# Patient Record
Sex: FEMALE | Race: BLACK OR AFRICAN AMERICAN | NOT HISPANIC OR LATINO | Employment: FULL TIME | URBAN - METROPOLITAN AREA
[De-identification: names, ages, dates, MRNs, and addresses within clinical notes are randomized per-mention and may not be internally consistent; named-entity substitution may affect disease eponyms.]

---

## 2019-08-06 ENCOUNTER — TELEPHONE (OUTPATIENT)
Dept: RHEUMATOLOGY | Facility: CLINIC | Age: 29
End: 2019-08-06

## 2019-08-06 NOTE — TELEPHONE ENCOUNTER
----- Message from Kateryna Villalta sent at 8/6/2019 10:36 AM CDT -----  Contact: Gwendolyn- from Dr. Sammy Gu office  Gwendolyn states she has faxed over 3 referrals to Dr. Hendrix's office trying to get the patient scheduled for an appointment and haven't heard back from the office. The patient is willing to see anyone in Rheumatology. Please call back at Ph [phone].(Gwendolyn)

## 2024-04-22 ENCOUNTER — OFFICE VISIT (OUTPATIENT)
Dept: URGENT CARE | Facility: CLINIC | Age: 34
End: 2024-04-22
Payer: COMMERCIAL

## 2024-04-22 VITALS
RESPIRATION RATE: 85 BRPM | HEART RATE: 68 BPM | SYSTOLIC BLOOD PRESSURE: 120 MMHG | TEMPERATURE: 99 F | OXYGEN SATURATION: 100 % | DIASTOLIC BLOOD PRESSURE: 68 MMHG

## 2024-04-22 DIAGNOSIS — J01.00 ACUTE NON-RECURRENT MAXILLARY SINUSITIS: Primary | ICD-10-CM

## 2024-04-22 LAB
CTP QC/QA: YES
SARS-COV-2 AG RESP QL IA.RAPID: NEGATIVE

## 2024-04-22 PROCEDURE — 87811 SARS-COV-2 COVID19 W/OPTIC: CPT | Mod: QW,S$GLB,, | Performed by: NURSE PRACTITIONER

## 2024-04-22 PROCEDURE — 99213 OFFICE O/P EST LOW 20 MIN: CPT | Mod: S$GLB,,, | Performed by: NURSE PRACTITIONER

## 2024-04-22 RX ORDER — DEXAMETHASONE 4 MG/1
4 TABLET ORAL DAILY
Qty: 5 TABLET | Refills: 0 | Status: SHIPPED | OUTPATIENT
Start: 2024-04-22 | End: 2024-04-27

## 2024-04-22 RX ORDER — PROMETHAZINE HYDROCHLORIDE AND DEXTROMETHORPHAN HYDROBROMIDE 6.25; 15 MG/5ML; MG/5ML
5 SYRUP ORAL EVERY 6 HOURS PRN
Qty: 180 ML | Refills: 0 | Status: SHIPPED | OUTPATIENT
Start: 2024-04-22

## 2024-04-22 RX ORDER — IPRATROPIUM BROMIDE 42 UG/1
2 SPRAY, METERED NASAL 4 TIMES DAILY PRN
Qty: 15 ML | Refills: 1 | Status: SHIPPED | OUTPATIENT
Start: 2024-04-22

## 2024-04-22 RX ORDER — CEFDINIR 300 MG/1
300 CAPSULE ORAL 2 TIMES DAILY
Qty: 14 CAPSULE | Refills: 0 | Status: SHIPPED | OUTPATIENT
Start: 2024-04-22 | End: 2024-04-29

## 2024-04-22 NOTE — PROGRESS NOTES
Subjective:      Patient ID: Sylvain Palacios is a 33 y.o. female.    Vitals:  oral temperature is 99.2 °F (37.3 °C). Her blood pressure is 120/68 and her pulse is 68. Her respiration is 85 (abnormal) and oxygen saturation is 100%.     Chief Complaint: Sinus Problem    Patient is presenting pain on the left side of her face with slight swelling , sinus issues, coughing green mucus, and headaches. Taking tylenol, alca celter plus. Patient has been sick for over a week.    Facial Pain  This is a new problem. The current episode started in the past 7 days. The problem occurs constantly. The problem has been gradually worsening. Associated symptoms include chest pain, congestion, coughing, fatigue, headaches, a sore throat and swollen glands. Pertinent negatives include no abdominal pain, anorexia, arthralgias, change in bowel habit, chills, diaphoresis, fever, joint swelling, myalgias, nausea, neck pain, numbness, rash, urinary symptoms, vertigo, visual change, vomiting or weakness. The treatment provided no relief.       Constitution: Positive for fatigue. Negative for chills, sweating and fever.   HENT:  Positive for congestion and sore throat.    Neck: Negative for neck pain.   Cardiovascular:  Positive for chest pain.   Respiratory:  Positive for cough.    Gastrointestinal:  Negative for abdominal pain, nausea and vomiting.   Musculoskeletal:  Negative for joint pain, joint swelling and muscle ache.   Skin:  Negative for rash.   Neurological:  Positive for headaches. Negative for history of vertigo and numbness.      Objective:     Vitals:    04/22/24 0839   BP: 120/68   BP Location: Left arm   Patient Position: Sitting   BP Method: Medium (Automatic)   Pulse: 68   Resp: (!) 85   Temp: 99.2 °F (37.3 °C)   TempSrc: Oral   SpO2: 100%       Physical Exam   Constitutional: She is oriented to person, place, and time. She appears well-developed. She is cooperative.  Non-toxic appearance. She does not appear ill. No  distress.   HENT:   Head: Normocephalic and atraumatic.   Ears:   Right Ear: Hearing, external ear and ear canal normal. Tympanic membrane is injected and retracted.   Left Ear: Hearing, external ear and ear canal normal. Tympanic membrane is injected and retracted.   Nose: Congestion present. No mucosal edema, rhinorrhea or nasal deformity. No epistaxis. Right sinus exhibits maxillary sinus tenderness. Left sinus exhibits maxillary sinus tenderness.   Mouth/Throat: Uvula is midline and mucous membranes are normal. Mucous membranes are moist. No trismus in the jaw. Normal dentition. No uvula swelling. Posterior oropharyngeal erythema present. No oropharyngeal exudate or posterior oropharyngeal edema.   Eyes: Conjunctivae and lids are normal. Pupils are equal, round, and reactive to light. No scleral icterus. Extraocular movement intact   Neck: Trachea normal and phonation normal. Neck supple. No edema present. No erythema present. No neck rigidity present.   Cardiovascular: Normal rate, regular rhythm, normal heart sounds and normal pulses.   Pulmonary/Chest: Effort normal and breath sounds normal. No respiratory distress. She has no decreased breath sounds. She has no rhonchi.   Abdominal: Normal appearance.   Musculoskeletal: Normal range of motion.         General: No deformity. Normal range of motion.   Lymphadenopathy:     She has cervical adenopathy.   Neurological: She is alert and oriented to person, place, and time. She exhibits normal muscle tone. Coordination normal.   Skin: Skin is warm, dry, intact, not diaphoretic and not pale.   Psychiatric: Her speech is normal and behavior is normal. Judgment and thought content normal.   Nursing note and vitals reviewed.      Assessment:     1. Acute non-recurrent maxillary sinusitis        Plan:     Patient stable for discharge and home management of condition    Acute non-recurrent maxillary sinusitis  -     SARS Coronavirus 2 Antigen, POCT Manual Read  -      cefdinir (OMNICEF) 300 MG capsule; Take 1 capsule (300 mg total) by mouth 2 (two) times daily. Take with food for 7 days  Dispense: 14 capsule; Refill: 0  -     dexAMETHasone (DECADRON) 4 MG Tab; Take 1 tablet (4 mg total) by mouth once daily. Take with food for 5 days  Dispense: 5 tablet; Refill: 0  -     ipratropium (ATROVENT) 42 mcg (0.06 %) nasal spray; 2 sprays by Each Nostril route 4 (four) times daily as needed for Rhinitis (congestion).  Dispense: 15 mL; Refill: 1  -     promethazine-dextromethorphan (PROMETHAZINE-DM) 6.25-15 mg/5 mL Syrp; Take 5 mLs by mouth every 6 (six) hours as needed (cough/congestion).  Dispense: 180 mL; Refill: 0        Patient Instructions   Increase fluids   Rest activity ad gene   Tylenol 650 mg every 4-6 hrs as needed for fever headache body aches   Advil 200 mg 2-3 tabs every 6 hrs as needed for fever, headache body aches---Must take with food   Complete antibiotic as prescribed   Atrovent nasal spray as needed for congestion   Cough suspension as needed for cough   Supportive care measures   If symptoms persist or worsen follow up [facility] or PCP

## 2024-04-22 NOTE — LETTER
April 22, 2024      [facility]  [address], [address]  [facility] [place] [facility] [address]  Phone: [phone]  Fax: [phone]       Patient: Sylvain Palacios   YOB: 1990  Date of Visit: 04/22/2024    To Whom It May Concern:    Dylan Palacios  was at [facility] on 04/22/2024. The patient may return to work/school on 04/24/2024 with no restrictions.     If you have any questions or concerns, or if I can be of further assistance, please do not hesitate to contact me.    Sincerely,          Vidal Panda, NP

## 2024-04-22 NOTE — PATIENT INSTRUCTIONS
Increase fluids   Rest activity ad gene   Tylenol 650 mg every 4-6 hrs as needed for fever headache body aches   Advil 200 mg 2-3 tabs every 6 hrs as needed for fever, headache body aches---Must take with food   Complete antibiotic as prescribed   Atrovent nasal spray as needed for congestion   Cough suspension as needed for cough   Supportive care measures   If symptoms persist or worsen follow up [facility] or PCP

## 2025-01-28 ENCOUNTER — LAB VISIT (OUTPATIENT)
Dept: LAB | Facility: HOSPITAL | Age: 35
End: 2025-01-28
Attending: INTERNAL MEDICINE
Payer: COMMERCIAL

## 2025-01-28 ENCOUNTER — OFFICE VISIT (OUTPATIENT)
Dept: INTERNAL MEDICINE | Facility: CLINIC | Age: 35
End: 2025-01-28
Payer: COMMERCIAL

## 2025-01-28 VITALS
DIASTOLIC BLOOD PRESSURE: 62 MMHG | HEIGHT: 64 IN | OXYGEN SATURATION: 98 % | BODY MASS INDEX: 22.43 KG/M2 | TEMPERATURE: 98 F | SYSTOLIC BLOOD PRESSURE: 104 MMHG | HEART RATE: 84 BPM | RESPIRATION RATE: 20 BRPM | WEIGHT: 131.38 LBS

## 2025-01-28 DIAGNOSIS — K59.09 CHRONIC CONSTIPATION: ICD-10-CM

## 2025-01-28 DIAGNOSIS — Z76.89 ENCOUNTER TO ESTABLISH CARE: Primary | ICD-10-CM

## 2025-01-28 DIAGNOSIS — Z13.6 SCREENING FOR ISCHEMIC HEART DISEASE: ICD-10-CM

## 2025-01-28 DIAGNOSIS — R35.0 URINARY FREQUENCY: ICD-10-CM

## 2025-01-28 LAB
BILIRUB UR QL STRIP: NEGATIVE
CLARITY UR REFRACT.AUTO: CLEAR
COLOR UR AUTO: YELLOW
GLUCOSE UR QL STRIP: NEGATIVE
HGB UR QL STRIP: NEGATIVE
KETONES UR QL STRIP: NEGATIVE
LEUKOCYTE ESTERASE UR QL STRIP: NEGATIVE
NITRITE UR QL STRIP: NEGATIVE
PH UR STRIP: 7 [PH] (ref 5–8)
PROT UR QL STRIP: NEGATIVE
SP GR UR STRIP: 1.02 (ref 1–1.03)
URN SPEC COLLECT METH UR: NORMAL

## 2025-01-28 PROCEDURE — G2211 COMPLEX E/M VISIT ADD ON: HCPCS | Mod: S$GLB,,, | Performed by: INTERNAL MEDICINE

## 2025-01-28 PROCEDURE — 3078F DIAST BP <80 MM HG: CPT | Mod: CPTII,S$GLB,, | Performed by: INTERNAL MEDICINE

## 2025-01-28 PROCEDURE — 83036 HEMOGLOBIN GLYCOSYLATED A1C: CPT | Performed by: INTERNAL MEDICINE

## 2025-01-28 PROCEDURE — 80061 LIPID PANEL: CPT | Performed by: INTERNAL MEDICINE

## 2025-01-28 PROCEDURE — 99999 PR PBB SHADOW E&M-EST. PATIENT-LVL IV: CPT | Mod: PBBFAC,,, | Performed by: INTERNAL MEDICINE

## 2025-01-28 PROCEDURE — 99214 OFFICE O/P EST MOD 30 MIN: CPT | Mod: S$GLB,,, | Performed by: INTERNAL MEDICINE

## 2025-01-28 PROCEDURE — 1160F RVW MEDS BY RX/DR IN RCRD: CPT | Mod: CPTII,S$GLB,, | Performed by: INTERNAL MEDICINE

## 2025-01-28 PROCEDURE — 36415 COLL VENOUS BLD VENIPUNCTURE: CPT | Performed by: INTERNAL MEDICINE

## 2025-01-28 PROCEDURE — 1159F MED LIST DOCD IN RCRD: CPT | Mod: CPTII,S$GLB,, | Performed by: INTERNAL MEDICINE

## 2025-01-28 PROCEDURE — 3008F BODY MASS INDEX DOCD: CPT | Mod: CPTII,S$GLB,, | Performed by: INTERNAL MEDICINE

## 2025-01-28 PROCEDURE — 80053 COMPREHEN METABOLIC PANEL: CPT | Performed by: INTERNAL MEDICINE

## 2025-01-28 PROCEDURE — 81003 URINALYSIS AUTO W/O SCOPE: CPT | Performed by: INTERNAL MEDICINE

## 2025-01-28 PROCEDURE — 3074F SYST BP LT 130 MM HG: CPT | Mod: CPTII,S$GLB,, | Performed by: INTERNAL MEDICINE

## 2025-01-28 PROCEDURE — 85025 COMPLETE CBC W/AUTO DIFF WBC: CPT | Performed by: INTERNAL MEDICINE

## 2025-01-28 PROCEDURE — 3044F HG A1C LEVEL LT 7.0%: CPT | Mod: CPTII,S$GLB,, | Performed by: INTERNAL MEDICINE

## 2025-01-29 LAB
ALBUMIN SERPL BCP-MCNC: 4.5 G/DL (ref 3.5–5.2)
ALP SERPL-CCNC: 48 U/L (ref 40–150)
ALT SERPL W/O P-5'-P-CCNC: 15 U/L (ref 10–44)
ANION GAP SERPL CALC-SCNC: 10 MMOL/L (ref 8–16)
AST SERPL-CCNC: 14 U/L (ref 10–40)
BASOPHILS # BLD AUTO: 0.05 K/UL (ref 0–0.2)
BASOPHILS NFR BLD: 0.7 % (ref 0–1.9)
BILIRUB SERPL-MCNC: 0.7 MG/DL (ref 0.1–1)
BUN SERPL-MCNC: 7 MG/DL (ref 6–20)
CALCIUM SERPL-MCNC: 9.7 MG/DL (ref 8.7–10.5)
CHLORIDE SERPL-SCNC: 108 MMOL/L (ref 95–110)
CHOLEST SERPL-MCNC: 196 MG/DL (ref 120–199)
CHOLEST/HDLC SERPL: 3.4 {RATIO} (ref 2–5)
CO2 SERPL-SCNC: 24 MMOL/L (ref 23–29)
CREAT SERPL-MCNC: 0.7 MG/DL (ref 0.5–1.4)
DIFFERENTIAL METHOD BLD: ABNORMAL
EOSINOPHIL # BLD AUTO: 0.1 K/UL (ref 0–0.5)
EOSINOPHIL NFR BLD: 0.8 % (ref 0–8)
ERYTHROCYTE [DISTWIDTH] IN BLOOD BY AUTOMATED COUNT: 12.4 % (ref 11.5–14.5)
EST. GFR  (NO RACE VARIABLE): >60 ML/MIN/1.73 M^2
ESTIMATED AVG GLUCOSE: 100 MG/DL (ref 68–131)
GLUCOSE SERPL-MCNC: 81 MG/DL (ref 70–110)
HBA1C MFR BLD: 5.1 % (ref 4–5.6)
HCT VFR BLD AUTO: 41.8 % (ref 37–48.5)
HDLC SERPL-MCNC: 58 MG/DL (ref 40–75)
HDLC SERPL: 29.6 % (ref 20–50)
HGB BLD-MCNC: 13.2 G/DL (ref 12–16)
IMM GRANULOCYTES # BLD AUTO: 0.01 K/UL (ref 0–0.04)
IMM GRANULOCYTES NFR BLD AUTO: 0.1 % (ref 0–0.5)
LDLC SERPL CALC-MCNC: 122.8 MG/DL (ref 63–159)
LYMPHOCYTES # BLD AUTO: 2.4 K/UL (ref 1–4.8)
LYMPHOCYTES NFR BLD: 30.9 % (ref 18–48)
MCH RBC QN AUTO: 31 PG (ref 27–31)
MCHC RBC AUTO-ENTMCNC: 31.6 G/DL (ref 32–36)
MCV RBC AUTO: 98 FL (ref 82–98)
MONOCYTES # BLD AUTO: 0.4 K/UL (ref 0.3–1)
MONOCYTES NFR BLD: 4.9 % (ref 4–15)
NEUTROPHILS # BLD AUTO: 4.8 K/UL (ref 1.8–7.7)
NEUTROPHILS NFR BLD: 62.6 % (ref 38–73)
NONHDLC SERPL-MCNC: 138 MG/DL
NRBC BLD-RTO: 0 /100 WBC
PLATELET # BLD AUTO: 281 K/UL (ref 150–450)
PMV BLD AUTO: 11.7 FL (ref 9.2–12.9)
POTASSIUM SERPL-SCNC: 4 MMOL/L (ref 3.5–5.1)
PROT SERPL-MCNC: 8.1 G/DL (ref 6–8.4)
RBC # BLD AUTO: 4.26 M/UL (ref 4–5.4)
SODIUM SERPL-SCNC: 142 MMOL/L (ref 136–145)
TRIGL SERPL-MCNC: 76 MG/DL (ref 30–150)
WBC # BLD AUTO: 7.6 K/UL (ref 3.9–12.7)

## 2025-01-29 NOTE — PROGRESS NOTES
Sylvain Noble Palacios  34 y.o. Black or  female  [id number]    Chief Complaint:  Chief Complaint   Patient presents with    Establish Care       History of Present Illness:  History of Present Illness    CHIEF COMPLAINT:  Ms. Palacios presents today to establish care.     GENITOURINARY:  She reports frequent and urgent urination, demonstrated by needing to urinate during the appointment.    GASTROINTESTINAL:  She reports chronic constipation. She was prescribed Miralax but instead uses natural remedies including juices provided by her mother, which she reports are effective.    PREVENTIVE CARE:  She declines all vaccinations including influenza and tetanus.         Review of Systems   Constitutional:  Negative for fever, malaise/fatigue and weight loss.   HENT:  Positive for tinnitus. Negative for hearing loss.    Eyes:  Negative for blurred vision.   Respiratory:  Negative for cough and shortness of breath.    Cardiovascular:  Negative for chest pain, palpitations and leg swelling.   Gastrointestinal:  Positive for constipation. Negative for abdominal pain, blood in stool and diarrhea.   Genitourinary:  Positive for frequency.   Musculoskeletal:  Positive for back pain. Negative for joint pain.   Skin:  Negative for rash.   Neurological:  Negative for dizziness and headaches.   Psychiatric/Behavioral:  Negative for depression. The patient is nervous/anxious and has insomnia.        Laboratory  Lab Results   Component Value Date    WBC 7.60 01/28/2025    HGB 13.2 01/28/2025    HCT 41.8 01/28/2025     01/28/2025    CHOL 196 01/28/2025    TRIG 76 01/28/2025    HDL 58 01/28/2025    ALT 15 01/28/2025    AST 14 01/28/2025     01/28/2025    K 4.0 01/28/2025     01/28/2025    CREATININE 0.7 01/28/2025    BUN 7 01/28/2025    CO2 24 01/28/2025    TSH 0.443 05/06/2015    HGBA1C 5.1 01/28/2025     Lab Results   Component Value Date    LDLCALC 122.8 01/28/2025       The following were reviewed:  Active problem list, medication list, allergies, family history, social history, and Health Maintenance.     History:  Past Medical History:   Diagnosis Date    Abnormal uterine bleeding (AUB)     Chlamydia     HPV (human papilloma virus) anogenital infection     Kidney stone     Twin pregnancy     Urinary tract infection     Vitamin D deficiency      Past Surgical History:   Procedure Laterality Date    COLPOSCOPY      DILATION AND CURETTAGE OF UTERUS  01/31/2024    MAB    ELECTIVE TERMINATION OF PREGNANCY      INTRAUTERINE DEVICE INSERTION      MIRENA    VAGINAL DELIVERY  2006     Family History   Problem Relation Name Age of Onset    Thyroid disease Mother      COPD Father      Kidney cancer Father      Eczema Neg Hx      Lupus Neg Hx      Psoriasis Neg Hx      Melanoma Neg Hx       Social History     Socioeconomic History    Marital status:    Tobacco Use    Smoking status: Never    Smokeless tobacco: Never   Substance and Sexual Activity    Alcohol use: No    Drug use: No    Sexual activity: Yes     Partners: Male     Birth control/protection: I.U.D.   Other Topics Concern    Are you pregnant or think you may be? No    Breast-feeding No     There is no problem list on file for this patient.    Review of patient's allergies indicates:   Allergen Reactions    Metoclopramide hcl Itching       Health Maintenance  Health Maintenance Topics with due status: Not Due       Topic Last Completion Date    Cervical Cancer Screening 02/26/2024    RSV Vaccine (Age 60+ and Pregnant patients) Not Due     Health Maintenance Due   Topic Date Due    TETANUS VACCINE  Never done    Influenza Vaccine (1) 09/01/2024    COVID-19 Vaccine (1 - 2024-25 season) Never done       Medications:  Current Outpatient Medications on File Prior to Visit   Medication Sig Dispense Refill    levonorgestreL (MIRENA) 52 mg IUD 52 mg by Intrauterine route once.      minoxidiL (LONITEN) 2.5 MG tablet Take 1.25 mg by mouth.       Current  Facility-Administered Medications on File Prior to Visit   Medication Dose Route Frequency Provider Last Rate Last Admin    levonorgestreL (Mirena) 52 mg IUD 1 Intra Uterine Device  1 Intra Uterine Device Intrauterine     1 Intra Uterine Device at 11/11/24 1030       Medications have been reviewed and reconciled with patient at visit today.    Exam:  Vitals:    01/28/25 1032   BP: 104/62   Pulse: 84   Resp: 20   Temp: 97.7 °F (36.5 °C)     Weight: 59.6 kg (131 lb 6.3 oz)   Body mass index is 22.55 kg/m².      Physical Exam    Vitals: Reviewed.  Constitutional: No acute distress. Well-developed. Not ill-appearing.  Eyes: No scleral icterus.  Neck: No cervical lymphadenopathy.  Cardiovascular: Normal rate and regular rhythm. Normal heart sounds.  Pulmonary: Pulmonary effort is normal. No respiratory distress. Normal breath sounds.  Abdomen: Soft. Nontender. Nondistended. Normoactive bowel sounds.  Extremities: No edema.  Skin: Warm. Dry.  Neurological: Alert and oriented to person, place, and time.  Psychiatric: Behavior normal.         Assessment:  The primary encounter diagnosis was Encounter to establish care. Diagnoses of Urinary frequency and Screening for ischemic heart disease were also pertinent to this visit.      Assessment & Plan    URINARY FREQUENCY:  - Evaluated the patient's urinary frequency issues.  - Noted that the patient was previously seen by a now-retired urologist.  - Ordered urine testing for analysis.  - Instructed the patient to follow up after providing the urine sample and returning to the exam room.  - Acknowledged the patient's issues with frequent urination.  - Planned to order additional labs to evaluate the condition further.    CONSTIPATION:  - Assessed the patient's constipation.  - Ms. Palacios reports being constantly constipated.  - Inquired about the patient's bowel movements and constipation patterns.  - Noted that the patient regularly uses natural remedies prepared by their  mother instead of the previously prescribed Miralax.  - Recommend continuing Miralax as needed for constipation management.  - Advised the patient to monitor the effectiveness of natural remedies and report any changes in symptoms.    PATIENT REFUSAL OF VACCINATION:  - Ms. Palacios declined flu vaccine and tetanus.    LABS:  - Blood work ordered.  - Proceed to lab for labs after returning to exam room.    OTHER INSTRUCTIONS:  - Evaluated need for vaccinations.  - Performed brief physical exam, including lung auscultation.

## 2025-04-20 ENCOUNTER — OFFICE VISIT (OUTPATIENT)
Dept: URGENT CARE | Facility: CLINIC | Age: 35
End: 2025-04-20
Payer: COMMERCIAL

## 2025-04-20 VITALS
BODY MASS INDEX: 22.71 KG/M2 | DIASTOLIC BLOOD PRESSURE: 76 MMHG | HEART RATE: 81 BPM | WEIGHT: 133 LBS | OXYGEN SATURATION: 98 % | HEIGHT: 64 IN | RESPIRATION RATE: 16 BRPM | TEMPERATURE: 99 F | SYSTOLIC BLOOD PRESSURE: 118 MMHG

## 2025-04-20 DIAGNOSIS — J30.2 SEASONAL ALLERGIES: ICD-10-CM

## 2025-04-20 DIAGNOSIS — J02.9 SORE THROAT: Primary | ICD-10-CM

## 2025-04-20 LAB
CTP QC/QA: YES
MOLECULAR STREP A: NEGATIVE

## 2025-04-20 PROCEDURE — 87651 STREP A DNA AMP PROBE: CPT | Mod: QW,S$GLB,, | Performed by: NURSE PRACTITIONER

## 2025-04-20 PROCEDURE — 99213 OFFICE O/P EST LOW 20 MIN: CPT | Mod: S$GLB,,, | Performed by: NURSE PRACTITIONER

## 2025-04-20 RX ORDER — FLUTICASONE PROPIONATE 50 MCG
2 SPRAY, SUSPENSION (ML) NASAL DAILY
Qty: 15.8 ML | Refills: 0 | Status: SHIPPED | OUTPATIENT
Start: 2025-04-20 | End: 2025-05-04

## 2025-04-20 RX ORDER — CETIRIZINE HYDROCHLORIDE 10 MG/1
10 TABLET ORAL DAILY
Qty: 14 TABLET | Refills: 0 | Status: SHIPPED | OUTPATIENT
Start: 2025-04-20 | End: 2025-05-04

## 2025-04-20 RX ORDER — BENZONATATE 200 MG/1
200 CAPSULE ORAL 3 TIMES DAILY PRN
Qty: 25 CAPSULE | Refills: 0 | Status: SHIPPED | OUTPATIENT
Start: 2025-04-20 | End: 2025-04-30

## 2025-04-20 NOTE — PATIENT INSTRUCTIONS
You have allergic rhinitis with postnasal drainage.     This is caused by environmental triggers causing an increase in your mucous production. Symptoms include watery/itchy eyes, nasal drainage, sneezing, drainage down the back of your throat causing a sore throat and a mild cough.    Treatment is symptomatic control.     Make sure to stay well hydrated.    Use Nasal Saline to mechanically move any post nasal drip from your eustachian tube or from the back of your throat.    Use warm salt water gargles to ease your throat pain. Warm salt water gargles as needed for sore throat-  1/2 tsp salt to 1 cup warm water, gargle as desired.    Use an antihistamine such as Claritin, Zyrtec or Allegra to reduce the amount of mucous.      Use mucinex (guaifenisin) to break up mucous up to 2400mg/day to loosen any mucous.   The mucinex DM pill has a cough suppressant that can be sedating. It can be used at night to stop the tickle at the back of your throat.  You can use Mucinex D (it has guaifenesin and a high dose of pseudoephedrine) in the mornings to help decongest.    Use Flonase 1-2 sprays/nostril per day. It is a local acting steroid nasal spray, if you develop a bloody nose, stop using the medication immediately.    Sometimes Nyquil at night is beneficial to help you get some rest, however it is sedating and it does have an antihistamine, and tylenol.    Honey is a natural cough suppressant that can be used.    Tylenol up to 4,000 mg a day is safe for short periods and can be used for body aches, pain, and fever. However in high doses and prolonged use it can cause liver irritation.    Ibuprofen is a non-steroidal anti-inflammatory that can be used for body aches, pain, and fever.However it can also cause stomach irritation if over used.

## 2025-04-20 NOTE — PROGRESS NOTES
"Subjective:      Patient ID: Sylvain Palacios is a 34 y.o. female.    Vitals:  height is 5' 4" (1.626 m) and weight is 60.3 kg (133 lb). Her tympanic temperature is 99.1 °F (37.3 °C). Her blood pressure is 118/76 and her pulse is 81. Her respiration is 16 and oxygen saturation is 98%.     Chief Complaint: Sore Throat    Patient presents with a sore throat for 2weeks on and off.  It has been worse this past week.  Slight cough as well    Sore Throat   This is a new problem. The current episode started in the past 7 days. The problem has been unchanged. Sore throat worse side: both. There has been no fever. The pain is at a severity of 8/10. The pain is mild. Associated symptoms include coughing. Pertinent negatives include no abdominal pain, congestion, diarrhea, drooling, ear discharge, ear pain, headaches, hoarse voice, plugged ear sensation, neck pain, shortness of breath, stridor, swollen glands, trouble swallowing or vomiting. She has had exposure to strep. She has had no exposure to mono. She has tried nothing for the symptoms. The treatment provided no relief.       Constitution: Negative for chills and fever.   HENT:  Positive for postnasal drip and sore throat. Negative for ear pain, ear discharge, drooling, congestion and trouble swallowing.    Neck: Negative for neck pain.   Respiratory:  Positive for cough. Negative for shortness of breath, stridor and wheezing.    Gastrointestinal:  Negative for abdominal pain, vomiting, constipation and diarrhea.   Skin:  Negative for rash.   Allergic/Immunologic: Positive for seasonal allergies.   Neurological:  Negative for headaches.      Objective:     Physical Exam   Constitutional: She is oriented to person, place, and time. She appears well-developed. She is cooperative.  Non-toxic appearance. She does not appear ill. No distress.   HENT:   Head: Normocephalic and atraumatic.   Ears:   Right Ear: Hearing and external ear normal.   Left Ear: Hearing and " external ear normal.   Nose: Nose normal. No mucosal edema, rhinorrhea, nasal deformity or congestion. No epistaxis. Right sinus exhibits no maxillary sinus tenderness and no frontal sinus tenderness. Left sinus exhibits no maxillary sinus tenderness and no frontal sinus tenderness.   Mouth/Throat: Uvula is midline and mucous membranes are normal. No trismus in the jaw. Normal dentition. No uvula swelling. Posterior oropharyngeal erythema and cobblestoning present. No oropharyngeal exudate or posterior oropharyngeal edema.   Eyes: Conjunctivae and lids are normal. No scleral icterus.   Neck: Trachea normal and phonation normal. Neck supple. No edema present. No erythema present. No neck rigidity present.   Cardiovascular: Normal rate, regular rhythm, normal heart sounds and normal pulses.   Pulmonary/Chest: Effort normal and breath sounds normal. No respiratory distress. She has no decreased breath sounds. She has no wheezes. She has no rhonchi. She has no rales.   Abdominal: Normal appearance.   Musculoskeletal: Normal range of motion.         General: No deformity. Normal range of motion.   Neurological: She is alert and oriented to person, place, and time. She exhibits normal muscle tone. Coordination normal.   Skin: Skin is warm, dry, intact, not diaphoretic and not pale.   Psychiatric: Her speech is normal and behavior is normal. Judgment and thought content normal.   Nursing note and vitals reviewed.      Assessment:     1. Sore throat    2. Seasonal allergies        Plan:       Sore throat  -     POCT Strep A, Molecular    Seasonal allergies    Other orders  -     cetirizine (ZYRTEC) 10 MG tablet; Take 1 tablet (10 mg total) by mouth once daily. for 14 days  Dispense: 14 tablet; Refill: 0  -     benzonatate (TESSALON) 200 MG capsule; Take 1 capsule (200 mg total) by mouth 3 (three) times daily as needed for Cough.  Dispense: 25 capsule; Refill: 0  -     fluticasone propionate (FLONASE) 50 mcg/actuation nasal  spray; 2 sprays (100 mcg total) by Each Nostril route once daily. for 14 days  Dispense: 15.8 mL; Refill: 0          Medical Decision Making:   Initial Assessment:   After complete evaluation, including thorough history and physical exam, the patient's symptoms are most likely due to postnasal drip secondary to seasonal allergies. There are no concerning features on physical exam to suggest bacterial otitis media/externa, sinusitis, strep pharyngitis, or peritonsillar abscess. Vital signs do not suggest sepsis. Lung sounds are clear and not consistent with pneumonia. There is no neck pain or limited ROM to suggest retropharyngeal abscess or meningitis. In clinic testing for strep is negative.The patient will be treated with supportive care. Will provide RX for tessalon upon D/C. Follow up instructions and ED precautions provided.            Results for orders placed or performed in visit on 04/20/25   POCT Strep A, Molecular    Collection Time: 04/20/25 10:00 AM   Result Value Ref Range    Molecular Strep A, POC Negative Negative     Acceptable Yes      Patient Instructions   You have allergic rhinitis with postnasal drainage.     This is caused by environmental triggers causing an increase in your mucous production. Symptoms include watery/itchy eyes, nasal drainage, sneezing, drainage down the back of your throat causing a sore throat and a mild cough.    Treatment is symptomatic control.     Make sure to stay well hydrated.    Use Nasal Saline to mechanically move any post nasal drip from your eustachian tube or from the back of your throat.    Use warm salt water gargles to ease your throat pain. Warm salt water gargles as needed for sore throat-  1/2 tsp salt to 1 cup warm water, gargle as desired.    Use an antihistamine such as Claritin, Zyrtec or Allegra to reduce the amount of mucous.      Use mucinex (guaifenisin) to break up mucous up to 2400mg/day to loosen any mucous.   The mucinex DM  pill has a cough suppressant that can be sedating. It can be used at night to stop the tickle at the back of your throat.  You can use Mucinex D (it has guaifenesin and a high dose of pseudoephedrine) in the mornings to help decongest.    Use Flonase 1-2 sprays/nostril per day. It is a local acting steroid nasal spray, if you develop a bloody nose, stop using the medication immediately.    Sometimes Nyquil at night is beneficial to help you get some rest, however it is sedating and it does have an antihistamine, and tylenol.    Honey is a natural cough suppressant that can be used.    Tylenol up to 4,000 mg a day is safe for short periods and can be used for body aches, pain, and fever. However in high doses and prolonged use it can cause liver irritation.    Ibuprofen is a non-steroidal anti-inflammatory that can be used for body aches, pain, and fever.However it can also cause stomach irritation if over used.